# Patient Record
Sex: MALE | Race: BLACK OR AFRICAN AMERICAN | NOT HISPANIC OR LATINO | Employment: OTHER | URBAN - METROPOLITAN AREA
[De-identification: names, ages, dates, MRNs, and addresses within clinical notes are randomized per-mention and may not be internally consistent; named-entity substitution may affect disease eponyms.]

---

## 2019-11-19 ENCOUNTER — CONSULTATION (OUTPATIENT)
Dept: URBAN - METROPOLITAN AREA CLINIC 11 | Facility: CLINIC | Age: 59
End: 2019-11-19

## 2019-11-19 ASSESSMENT — VISUAL ACUITY
OS_CC: 20/40
OD_CC: 20/70
OS_PH: 20/30

## 2019-11-19 ASSESSMENT — TONOMETRY
OS_IOP_MMHG: 18
OD_IOP_MMHG: 17

## 2019-11-19 NOTE — PATIENT DISCUSSION
Greater than 50% of exam spent in face to face dialogue with . Chacorta Ross. I have explained that he has swelling in the central portion of his retina, due to his diabetic condition. I have recommended that he have an injection of Avastin in his right eye, pending approval from his insurance company.  I will observe the left eye at this stage.

## 2020-04-14 ENCOUNTER — FOLLOW UP (OUTPATIENT)
Dept: URBAN - METROPOLITAN AREA CLINIC 11 | Facility: CLINIC | Age: 60
End: 2020-04-14

## 2020-04-14 ASSESSMENT — VISUAL ACUITY
OD_CC: 20/60+2
OS_CC: 20/25+2

## 2020-04-14 ASSESSMENT — TONOMETRY
OS_IOP_MMHG: 19
OD_IOP_MMHG: 18

## 2020-04-14 NOTE — PATIENT DISCUSSION
Due to persistent edema in the right eye, I have recommended that he have an injection of Triesence while he is here today. I will see him again in 4-6 weeks for re-evaluation with the possibility of an Avastin.

## 2020-04-14 NOTE — PROCEDURE NOTE: CLINICAL
PROCEDURE NOTE: Intravitreal Triesence #1 OD. Diagnosis: Diabetes, Type II with Ocular Complications. Anesthesia: Topical/Subconjunctival. Prep: Betadine Flush. Prior to injection, risks/benefits/alternatives discussed including infection, loss of vision, hemorrhage, cataract, glaucoma, retinal tears or detachment. The off-label status of Intravitreal Triesence also was reviewed. The patient wished to proceed with treatment. Betadine prep was performed. Intravitreal injection of Triescence 40.0 mg/0.10 ml was given. Injection site: 3-4 mm from the limbus in the inferotemporal quadrant. Patient tolerated procedure well. There were no complications. Central retinal artery was perfused after injection. Post injection instructions given. Tracking # *. Lot #: [id number]. Expiration Date: 11/1/2021. Inventory Id: null. Patient given office phone number/answering service number and advised to call immediately should there be an increase in floaters or redness, loss of vision or pain, or should they have any other questions or concerns. Andrez Chun

## 2020-05-12 ENCOUNTER — FOLLOW UP (OUTPATIENT)
Dept: URBAN - METROPOLITAN AREA CLINIC 11 | Facility: CLINIC | Age: 60
End: 2020-05-12

## 2020-05-12 ASSESSMENT — TONOMETRY: OD_IOP_MMHG: 25

## 2020-05-12 ASSESSMENT — VISUAL ACUITY
OD_PH: 20/50+1
OS_CC: 20/20
OD_CC: 20/50-2

## 2020-05-12 NOTE — PATIENT DISCUSSION
Discussed with patient that there is marked improvement. I recommended that he return for Focal laser treatment in the right eye in the next few weeks.

## 2020-09-21 ENCOUNTER — FOLLOW UP (OUTPATIENT)
Dept: URBAN - METROPOLITAN AREA CLINIC 11 | Facility: CLINIC | Age: 60
End: 2020-09-21

## 2020-09-21 ASSESSMENT — VISUAL ACUITY
OS_CC: 20/40-2
OD_CC: 20/40-2

## 2020-09-21 ASSESSMENT — TONOMETRY
OS_IOP_MMHG: 16
OD_IOP_MMHG: 16

## 2020-09-21 NOTE — PATIENT DISCUSSION
There has been an increase in edema in the left eye since his last visit. The right eye has responded well to treatment. I have asked him to return in a few weeks for focal laser treatment in the left eye.

## 2020-09-21 NOTE — PATIENT DISCUSSION
Discussed with patient that there is marked improvement in the right eye. I recommended that he return for Focal laser treatment in the left eye in the next few weeks.

## 2021-01-18 ENCOUNTER — FOLLOW UP (OUTPATIENT)
Dept: URBAN - METROPOLITAN AREA CLINIC 11 | Facility: CLINIC | Age: 61
End: 2021-01-18

## 2021-01-18 ASSESSMENT — TONOMETRY
OD_IOP_MMHG: 19
OS_IOP_MMHG: 19

## 2021-01-18 ASSESSMENT — VISUAL ACUITY
OD_CC: 20/70
OS_CC: 20/30

## 2021-01-18 NOTE — PATIENT DISCUSSION
There has been an increase in edema in the right eye since his last visit. The left eye has responded well to treatment. I have asked him to return in a few weeks for  supplemental focal laser treatment in his right eye.

## 2021-05-11 ENCOUNTER — FOLLOW UP (OUTPATIENT)
Dept: URBAN - METROPOLITAN AREA CLINIC 11 | Facility: CLINIC | Age: 61
End: 2021-05-11

## 2021-05-11 DIAGNOSIS — E11.3313: ICD-10-CM

## 2021-05-11 DIAGNOSIS — E11.3411: ICD-10-CM

## 2021-05-11 PROCEDURE — 99214 OFFICE O/P EST MOD 30 MIN: CPT

## 2021-05-11 PROCEDURE — 92134 CPTRZ OPH DX IMG PST SGM RTA: CPT

## 2021-05-11 ASSESSMENT — TONOMETRY
OD_IOP_MMHG: 17
OS_IOP_MMHG: 14

## 2021-05-11 ASSESSMENT — VISUAL ACUITY
OD_CC: 20/80
OS_CC: 20/30

## 2021-05-11 NOTE — PATIENT DISCUSSION
35 minutes was spent in face to face dialogue with patient.  I will plan 4 Avastin injections in each eye on an alternating schedule.  Approval will need to be obtained from patients insurance and then we will get him scheduled.  I will begin with the right eye first.

## 2021-05-11 NOTE — PATIENT DISCUSSION
Discussed with patient that there is increased edema seen today in both eyes.  I recommended that he return for Avastin injections in each eye on an alternating schedule treatment. I will plan 4 injections per eye.

## 2021-05-17 ENCOUNTER — TREATMENT ONLY (OUTPATIENT)
Dept: URBAN - METROPOLITAN AREA CLINIC 11 | Facility: CLINIC | Age: 61
End: 2021-05-17

## 2021-05-17 DIAGNOSIS — E11.3411: ICD-10-CM

## 2021-05-17 PROCEDURE — 67028 INJECTION EYE DRUG: CPT

## 2021-05-17 ASSESSMENT — TONOMETRY: OD_IOP_MMHG: 17

## 2021-05-17 ASSESSMENT — VISUAL ACUITY
OD_CC: 20/80+1
OS_CC: 20/40+2

## 2021-05-17 NOTE — PROCEDURE NOTE: CLINICAL
PROCEDURE NOTE: Avastin () #5 OD. Diagnosis: Diabetes, Type II with Ocular Complications. Anesthesia: Topical. Prep: Betadine Drops and Betadine Scrub. Intravitreal injection of Avastin 1.25mg/0.05 ml was given. Injection site: 3-4 mm from the limbus. Patient tolerated procedure well. CF vision checked. There were no complications. Post-op instructions given. Lot #: [id number]. Expiration Date: 6/16/2021. Inventory Id: [email or web address] Crystal

## 2021-05-17 NOTE — PATIENT DISCUSSION
Avastin injection done today in the right eye. I have asked Mr. Cornelius to return in one month for exam in both eyes and possible Avastin injection.

## 2021-06-21 ENCOUNTER — FOLLOW UP (OUTPATIENT)
Dept: URBAN - METROPOLITAN AREA CLINIC 11 | Facility: CLINIC | Age: 61
End: 2021-06-21

## 2021-06-21 DIAGNOSIS — E11.3411: ICD-10-CM

## 2021-06-21 DIAGNOSIS — E11.3312: ICD-10-CM

## 2021-06-21 PROCEDURE — 67028 INJECTION EYE DRUG: CPT

## 2021-06-21 PROCEDURE — 99213 OFFICE O/P EST LOW 20 MIN: CPT

## 2021-06-21 PROCEDURE — 92134 CPTRZ OPH DX IMG PST SGM RTA: CPT

## 2021-06-21 ASSESSMENT — TONOMETRY
OD_IOP_MMHG: 14
OS_IOP_MMHG: 17

## 2021-06-21 ASSESSMENT — VISUAL ACUITY
OS_CC: 20/40
OD_CC: 20/100

## 2021-06-21 NOTE — PATIENT DISCUSSION
35 minutes was spent in face to face dialogue with  Kee Lawler. I will plan to alternate treatment every 2-3 weeks for a total of 4 more in each eye.

## 2021-06-21 NOTE — PROCEDURE NOTE: CLINICAL
PROCEDURE NOTE: Avastin () #2 OS. Diagnosis: Diabetes, Type II with Ocular Complications. Anesthesia: Topical/Subconjunctival. Prep: Betadine Drops and Betadine Scrub. Intravitreal injection of Avastin 1.25mg/0.05 ml was given. Injection site: 3-4 mm from the limbus. Patient tolerated procedure well. CF vision checked. There were no complications. Post-op instructions given. Lot #: [id number]. Expiration Date: 9185-87-68J97:00:00. Inventory Id: dinora Alvarez

## 2021-06-21 NOTE — PATIENT DISCUSSION
Avastin injection done today in the left eye. He will return in 2-3 weeks for Avastin in the right eye.

## 2021-11-29 ENCOUNTER — FOLLOW UP (OUTPATIENT)
Dept: URBAN - METROPOLITAN AREA CLINIC 11 | Facility: CLINIC | Age: 61
End: 2021-11-29

## 2021-11-29 DIAGNOSIS — H35.373: ICD-10-CM

## 2021-11-29 DIAGNOSIS — E11.3313: ICD-10-CM

## 2021-11-29 PROCEDURE — 67028 INJECTION EYE DRUG: CPT

## 2021-11-29 PROCEDURE — 99214 OFFICE O/P EST MOD 30 MIN: CPT

## 2021-11-29 PROCEDURE — 92134 CPTRZ OPH DX IMG PST SGM RTA: CPT

## 2021-11-29 ASSESSMENT — VISUAL ACUITY
OS_CC: 20/20-2
OD_CC: 20/100

## 2021-11-29 ASSESSMENT — TONOMETRY
OD_IOP_MMHG: 17
OS_IOP_MMHG: 17

## 2021-11-29 NOTE — PATIENT DISCUSSION
Membrane is present in both eyes and is increasing slowly in the right.  Recommend observation for now.

## 2021-11-29 NOTE — PATIENT DISCUSSION
Discussed with patient that there is increased edema seen today in the right eye.  Avastin injection recommended in his right eye today.  Patient tolerated procedure well.

## 2021-11-29 NOTE — PATIENT DISCUSSION
35 minutes was spent in face to face dialogue with Mr. Luis Rios. I will plan three more Avastin injections in the right eye on a monthly basis.

## 2021-11-29 NOTE — PROCEDURE NOTE: CLINICAL
PROCEDURE NOTE: Avastin () #6 OD. Diagnosis: Diabetes, Type II with Ocular Complications. Anesthesia: Topical/Subconjunctival. Prep: Betadine Drops and Betadine Scrub. Intravitreal injection of Avastin 1.25mg/0.05 ml was given. Injection site: 3-4 mm from the limbus. Patient tolerated procedure well. CF vision checked. There were no complications. Post-op instructions given. Lot #: [id number]. Expiration Date: 1/8/2022. Inventory Id: [email or web address] Jacobs

## 2021-12-28 ENCOUNTER — FOLLOW UP (OUTPATIENT)
Dept: URBAN - METROPOLITAN AREA CLINIC 11 | Facility: CLINIC | Age: 61
End: 2021-12-28

## 2021-12-28 DIAGNOSIS — H35.373: ICD-10-CM

## 2021-12-28 DIAGNOSIS — E11.3313: ICD-10-CM

## 2021-12-28 PROCEDURE — 67028 INJECTION EYE DRUG: CPT

## 2021-12-28 PROCEDURE — 92134 CPTRZ OPH DX IMG PST SGM RTA: CPT

## 2021-12-28 PROCEDURE — 99213 OFFICE O/P EST LOW 20 MIN: CPT

## 2021-12-28 ASSESSMENT — TONOMETRY
OD_IOP_MMHG: 18
OS_IOP_MMHG: 21

## 2021-12-28 ASSESSMENT — VISUAL ACUITY
OD_PH: 20/60-1
OD_CC: 20/100
OS_CC: 20/40

## 2021-12-28 NOTE — PATIENT DISCUSSION
35 minutes was spent in face to face dialogue with  Rayshawn Boucher. There is new edema in the left eye today.  I will plan an Avastin today in the left followed by three more Avastin injections in BOTH eyes on an alternating every 2-3 week basis, starting with the left eye today.

## 2021-12-28 NOTE — PROCEDURE NOTE: CLINICAL
PROCEDURE NOTE: Avastin () #3 OS. Diagnosis: Diabetes, Type II with Ocular Complications. Anesthesia: Topical/Subconjunctival. Prep: Betadine Drops and Betadine Scrub. Intravitreal injection of Avastin 1.25mg/0.05 ml was given. Injection site: 3-4 mm from the limbus. Patient tolerated procedure well. CF vision checked. There were no complications. Post-op instructions given. Lot #: [id number]. Expiration Date: 5091-07-79D86:00:00. Inventory Id: [id number]. Steve Carballo

## 2021-12-28 NOTE — PATIENT DISCUSSION
There is increased central edema seen today in the LEFT eye.  Avastin injection recommended today in the left eye.

## 2022-01-18 ENCOUNTER — CLINIC PROCEDURE ONLY (OUTPATIENT)
Dept: URBAN - METROPOLITAN AREA CLINIC 11 | Facility: CLINIC | Age: 62
End: 2022-01-18

## 2022-01-18 DIAGNOSIS — E11.3313: ICD-10-CM

## 2022-01-18 PROCEDURE — 67028 INJECTION EYE DRUG: CPT

## 2022-01-18 ASSESSMENT — VISUAL ACUITY
OD_CC: 20/60-2
OS_CC: 20/30

## 2022-01-18 ASSESSMENT — TONOMETRY
OD_IOP_MMHG: 19
OS_IOP_MMHG: 19

## 2022-01-18 NOTE — PATIENT DISCUSSION
Avastin injection done in the right eye today. The injection was given and tolerated well by patient. Post-injection instructions were reviewed and understood by the patient. Alternating treatment in each eye.  He will return in 2-3 weeks for Avastin #4/6 in the LEFT eye.

## 2022-01-18 NOTE — PROCEDURE NOTE: CLINICAL
PROCEDURE NOTE: Avastin () #7 OD. Diagnosis: Diabetes, Type II with Ocular Complications. Anesthesia: Topical/Subconjunctival. Prep: Betadine Drops and Betadine Scrub. Betadine prep was performed. Product is within expiration date, and has been verified. An unopened 30 g needle was securely affixed to the 1 cc syringe. Excess drug and air bubbles were carefully expressed such that the plunger aligned with the .05 mL ok on the syringe. A lid speculum was used. After the Betadine prep, intravitreal injection of Avastin 1.25mg/0.05 ml was given. Injection site: 3-4 mm from the limbus. The needle was withdrawn; retinal perfusion was verified. Lid speculum removed. The eye was irrigated with sterile irrigating solution. The betadine was washed away. Patient tolerated procedure well. Count fingers vision was verified. There were no complications. Patient given office phone number/answering service phone number. Post-injection instructions were reviewed and understood. Symptoms of RD and endophthalmitis following intravitreal injection were discussed. Patient advised to call right away if any loss of vision, new floaters, or eye pain. Post-op instructions given. Patient was given the standard instruction sheet. Appropriate follow-up was arranged. Isabelle Becerra

## 2022-02-07 ENCOUNTER — CLINIC PROCEDURE ONLY (OUTPATIENT)
Dept: URBAN - METROPOLITAN AREA CLINIC 11 | Facility: CLINIC | Age: 62
End: 2022-02-07

## 2022-02-07 DIAGNOSIS — E11.3313: ICD-10-CM

## 2022-02-07 PROCEDURE — 67028 INJECTION EYE DRUG: CPT

## 2022-02-07 ASSESSMENT — VISUAL ACUITY
OD_CC: 20/60-1
OS_CC: 20/25-2

## 2022-02-07 ASSESSMENT — TONOMETRY: OS_IOP_MMHG: 16

## 2022-02-07 NOTE — PROCEDURE NOTE: CLINICAL
PROCEDURE NOTE: Avastin () #4 OS. Diagnosis: Diabetes, Type II with Ocular Complications. Anesthesia: Topical/Subconjunctival. Prep: Betadine Drops and Betadine Scrub. Betadine prep was performed. Product is within expiration date, and has been verified. An unopened 30 g needle was securely affixed to the 1 cc syringe. Excess drug and air bubbles were carefully expressed such that the plunger aligned with the .05 mL ok on the syringe. A lid speculum was used. After the Betadine prep, intravitreal injection of Avastin 1.25mg/0.05 ml was given. Injection site: 3-4 mm from the limbus. The needle was withdrawn; retinal perfusion was verified. Lid speculum removed. The eye was irrigated with sterile irrigating solution. The betadine was washed away. Patient tolerated procedure well. Count fingers vision was verified. There were no complications. Patient given office phone number/answering service phone number. Post-injection instructions were reviewed and understood. Symptoms of RD and endophthalmitis following intravitreal injection were discussed. Patient advised to call right away if any loss of vision, new floaters, or eye pain. Post-op instructions given. Patient was given the standard instruction sheet. Appropriate follow-up was arranged. Wes Kitchen

## 2022-02-10 NOTE — PATIENT DISCUSSION
Avastin done today in the left eye without complication. He will return in 2-3 weeks for Avastin RIGHT eye. scheduling requirements include:    Procedure: Screening Colonoscopy    Facility: [facility]    Admission Type: Outpatient Surgery    Time Needed: 30 min    Anesthesia: MAC    Surgical Assist: no    Co-Surgeon: no    Special equipment: None      Preoperative History and Physical: To be updated prior to procedure    Preoperative labs: None    Schedule postoperative appointment: None    Patient Prep Instructions:                          Dr. Mack bowel prep    Additional Instructions:   - No ASA for 7 days before surgery    - No Coumadin for 5 days before surgery    - No Plavix for 7 days before surgery    Hospital Orders:  - None

## 2022-02-21 ENCOUNTER — CLINIC PROCEDURE ONLY (OUTPATIENT)
Dept: URBAN - METROPOLITAN AREA CLINIC 11 | Facility: CLINIC | Age: 62
End: 2022-02-21

## 2022-02-21 DIAGNOSIS — E11.3313: ICD-10-CM

## 2022-02-21 PROCEDURE — 67028 INJECTION EYE DRUG: CPT

## 2022-02-21 ASSESSMENT — VISUAL ACUITY
OD_CC: 20/70
OS_CC: 20/40

## 2022-02-21 ASSESSMENT — TONOMETRY
OS_IOP_MMHG: 15
OD_IOP_MMHG: 16

## 2022-02-21 NOTE — PROCEDURE NOTE: CLINICAL
PROCEDURE NOTE: Avastin () #8 OD. Diagnosis: Diabetes, Type II with Ocular Complications. Anesthesia: Topical/Subconjunctival. Prep: Betadine Drops and Betadine Scrub. Betadine prep was performed. Product is within expiration date, and has been verified. An unopened 30 g needle was securely affixed to the 1 cc syringe. Excess drug and air bubbles were carefully expressed such that the plunger aligned with the .05 mL ok on the syringe. A lid speculum was used. After the Betadine prep, intravitreal injection of Avastin 1.25mg/0.05 ml was given. Injection site: 3-4 mm from the limbus. The needle was withdrawn; retinal perfusion was verified. Lid speculum removed. The eye was irrigated with sterile irrigating solution. The betadine was washed away. Patient tolerated procedure well. Count fingers vision was verified. There were no complications. Patient given office phone number/answering service phone number. Post-injection instructions were reviewed and understood. Symptoms of RD and endophthalmitis following intravitreal injection were discussed. Patient advised to call right away if any loss of vision, new floaters, or eye pain. Post-op instructions given. Patient was given the standard instruction sheet. Appropriate follow-up was arranged. Wes Kitchen

## 2022-02-21 NOTE — PATIENT DISCUSSION
Avastin done today in the RIGHT eye without complication. He will return in 2-3 weeks for Avastin LEFT eye.

## 2022-03-07 ENCOUNTER — CLINIC PROCEDURE ONLY (OUTPATIENT)
Dept: URBAN - METROPOLITAN AREA CLINIC 11 | Facility: CLINIC | Age: 62
End: 2022-03-07

## 2022-03-07 DIAGNOSIS — E11.3313: ICD-10-CM

## 2022-03-07 PROCEDURE — 67028 INJECTION EYE DRUG: CPT

## 2022-03-07 ASSESSMENT — TONOMETRY
OD_IOP_MMHG: 19
OS_IOP_MMHG: 18

## 2022-03-07 ASSESSMENT — VISUAL ACUITY
OD_CC: 20/60+2
OS_CC: 20/25-2

## 2022-03-07 NOTE — PATIENT DISCUSSION
Avastin injection #5/6 done today in the left eye.  He will return in 2-3 weeks for Avastin injection #9/9 in his RIGHT eye.

## 2022-03-07 NOTE — PROCEDURE NOTE: CLINICAL
PROCEDURE NOTE: Avastin () OS. Diagnosis: Diabetes, Type II with Ocular Complications. Anesthesia: Topical/Subconjunctival. Prep: Betadine Drops and Betadine Scrub. Betadine prep was performed. Product is within expiration date, and has been verified. An unopened 30 g needle was securely affixed to the 1 cc syringe. Excess drug and air bubbles were carefully expressed such that the plunger aligned with the .05 mL ok on the syringe. A lid speculum was used. After the Betadine prep, intravitreal injection of Avastin 1.25mg/0.05 ml was given. Injection site: 3-4 mm from the limbus. The needle was withdrawn; retinal perfusion was verified. Lid speculum removed. The eye was irrigated with sterile irrigating solution. The betadine was washed away. Patient tolerated procedure well. Count fingers vision was verified. There were no complications. Patient given office phone number/answering service phone number. Post-injection instructions were reviewed and understood. Symptoms of RD and endophthalmitis following intravitreal injection were discussed. Patient advised to call right away if any loss of vision, new floaters, or eye pain. Post-op instructions given. Patient was given the standard instruction sheet. Appropriate follow-up was arranged. Andres Mason

## 2022-03-21 ENCOUNTER — CLINIC PROCEDURE ONLY (OUTPATIENT)
Dept: URBAN - METROPOLITAN AREA CLINIC 11 | Facility: CLINIC | Age: 62
End: 2022-03-21

## 2022-03-21 DIAGNOSIS — E11.3313: ICD-10-CM

## 2022-03-21 PROCEDURE — 67028 INJECTION EYE DRUG: CPT

## 2022-03-21 ASSESSMENT — TONOMETRY
OS_IOP_MMHG: 18
OD_IOP_MMHG: 18

## 2022-03-21 ASSESSMENT — VISUAL ACUITY
OD_CC: 20/70-2
OS_CC: 20/30

## 2022-03-21 NOTE — PROCEDURE NOTE: CLINICAL
PROCEDURE NOTE: Avastin () #9 OD. Diagnosis: Diabetes, Type II with Ocular Complications. Anesthesia: Topical/Subconjunctival. Prep: Betadine Drops and Betadine Scrub. Betadine prep was performed. Product is within expiration date, and has been verified. An unopened 30 g needle was securely affixed to the 1 cc syringe. Excess drug and air bubbles were carefully expressed such that the plunger aligned with the .05 mL ok on the syringe. A lid speculum was used. After the Betadine prep, intravitreal injection of Avastin 1.25mg/0.05 ml was given. Injection site: 3-4 mm from the limbus. The needle was withdrawn; retinal perfusion was verified. Lid speculum removed. The eye was irrigated with sterile irrigating solution. The betadine was washed away. Patient tolerated procedure well. Count fingers vision was verified. There were no complications. Patient given office phone number/answering service phone number. Post-injection instructions were reviewed and understood. Symptoms of RD and endophthalmitis following intravitreal injection were discussed. Patient advised to call right away if any loss of vision, new floaters, or eye pain. Post-op instructions given. Patient was given the standard instruction sheet. Appropriate follow-up was arranged. Luis Larsen

## 2022-03-21 NOTE — PATIENT DISCUSSION
Avastin injection #9/9 done today in the RIGHT eye.  He will return in 2-3 weeks for Avastin injection # 6/6  in his LEFT eye.

## 2022-04-04 ENCOUNTER — CLINIC PROCEDURE ONLY (OUTPATIENT)
Dept: URBAN - METROPOLITAN AREA CLINIC 11 | Facility: CLINIC | Age: 62
End: 2022-04-04

## 2022-04-04 DIAGNOSIS — E11.3313: ICD-10-CM

## 2022-04-04 PROCEDURE — 67028 INJECTION EYE DRUG: CPT

## 2022-04-04 ASSESSMENT — TONOMETRY
OD_IOP_MMHG: 13
OS_IOP_MMHG: 12

## 2022-04-04 ASSESSMENT — VISUAL ACUITY
OD_CC: 20/70
OS_CC: 20/30-1
OD_PH: 20/60-1

## 2022-04-04 NOTE — PROCEDURE NOTE: CLINICAL
PROCEDURE NOTE: Avastin () #6 OS. Diagnosis: Diabetes, Type II with Ocular Complications. Anesthesia: Topical/Subconjunctival. Prep: Betadine Drops and Betadine Scrub. Betadine prep was performed. Product is within expiration date, and has been verified. An unopened 30 g needle was securely affixed to the 1 cc syringe. Excess drug and air bubbles were carefully expressed such that the plunger aligned with the .05 mL ok on the syringe. A lid speculum was used. After the Betadine prep, intravitreal injection of Avastin 1.25mg/0.05 ml was given. Injection site: 3-4 mm from the limbus. The needle was withdrawn; retinal perfusion was verified. Lid speculum removed. The eye was irrigated with sterile irrigating solution. The betadine was washed away. Patient tolerated procedure well. Count fingers vision was verified. There were no complications. Patient given office phone number/answering service phone number. Post-injection instructions were reviewed and understood. Symptoms of RD and endophthalmitis following intravitreal injection were discussed. Patient advised to call right away if any loss of vision, new floaters, or eye pain. Post-op instructions given. Patient was given the standard instruction sheet. Appropriate follow-up was arranged. Robert Sandy

## 2022-04-04 NOTE — PATIENT DISCUSSION
Avastin injection in the LEFT EYE #6/6  today. The injection was given and tolerated well by patient. Post-injection instructions were reviewed and understood by the patient.

## 2022-05-16 ENCOUNTER — FOLLOW UP (OUTPATIENT)
Dept: URBAN - METROPOLITAN AREA CLINIC 11 | Facility: CLINIC | Age: 62
End: 2022-05-16

## 2022-05-16 DIAGNOSIS — E11.3313: ICD-10-CM

## 2022-05-16 PROCEDURE — 92134 CPTRZ OPH DX IMG PST SGM RTA: CPT

## 2022-05-16 PROCEDURE — 99214 OFFICE O/P EST MOD 30 MIN: CPT

## 2022-05-16 ASSESSMENT — VISUAL ACUITY
OS_SC: 20/30-2
OD_SC: 20/80-1

## 2022-05-16 ASSESSMENT — TONOMETRY
OD_IOP_MMHG: 17
OS_IOP_MMHG: 16

## 2022-05-16 NOTE — PATIENT DISCUSSION
spent 40 min face to face with patient.  Discussed findings on exam today.  Persistent edema was seen OU on exam and oct.  Recommend returning in 1 mth for FOCAL OD then OS.

## 2022-06-20 RX ORDER — OMEPRAZOLE 20 MG/1
CAPSULE, DELAYED RELEASE ORAL
COMMUNITY

## 2022-06-20 RX ORDER — ATORVASTATIN CALCIUM 40 MG/1
TABLET, FILM COATED ORAL
COMMUNITY
Start: 2021-11-05

## 2022-06-20 RX ORDER — ASPIRIN 81 MG/1
TABLET, CHEWABLE ORAL
COMMUNITY

## 2022-06-28 ENCOUNTER — CLINIC PROCEDURE ONLY (OUTPATIENT)
Dept: URBAN - METROPOLITAN AREA CLINIC 11 | Facility: CLINIC | Age: 62
End: 2022-06-28

## 2022-06-28 DIAGNOSIS — E11.3313: ICD-10-CM

## 2022-06-28 PROCEDURE — 67210 TREATMENT OF RETINAL LESION: CPT

## 2022-06-28 ASSESSMENT — VISUAL ACUITY
OS_CC: 20/40-1
OD_CC: 20/200

## 2022-06-28 ASSESSMENT — TONOMETRY
OD_IOP_MMHG: 20
OS_IOP_MMHG: 25

## 2022-06-28 NOTE — PATIENT DISCUSSION
Focal laser done and tolerated well by the patient. Post-laser instructions were reviewed and understood by the patient.

## 2022-06-28 NOTE — PROCEDURE NOTE: CLINICAL
PROCEDURE NOTE: Focal Laser, Retina OD. Diagnosis: Diabetic Macular Edema. Anesthesia: Topical. Prior to focal laser, risks/benefits/alternatives to laser discussed including loss of vision and patient wished to proceed. Spot size: * um. Power: * mW. Number of pulses: *. Patient tolerated procedure well. There were no complications. Post procedure instructions given. Robb Spence

## 2022-09-27 ENCOUNTER — FOLLOW UP (OUTPATIENT)
Dept: URBAN - METROPOLITAN AREA CLINIC 11 | Facility: CLINIC | Age: 62
End: 2022-09-27

## 2022-09-27 DIAGNOSIS — H35.373: ICD-10-CM

## 2022-09-27 DIAGNOSIS — Z79.4: ICD-10-CM

## 2022-09-27 DIAGNOSIS — E11.3213: ICD-10-CM

## 2022-09-27 PROCEDURE — 99214 OFFICE O/P EST MOD 30 MIN: CPT

## 2022-09-27 PROCEDURE — 67028 INJECTION EYE DRUG: CPT

## 2022-09-27 PROCEDURE — 92134 CPTRZ OPH DX IMG PST SGM RTA: CPT

## 2022-09-27 ASSESSMENT — TONOMETRY
OS_IOP_MMHG: 14
OD_IOP_MMHG: 14

## 2022-09-27 ASSESSMENT — VISUAL ACUITY
OD_CC: 20/100-1
OS_CC: 20/30-2

## 2022-09-27 NOTE — PROCEDURE NOTE: CLINICAL
PROCEDURE NOTE: Avastin () #10 OD. Diagnosis: Diabetic Macular Edema. Anesthesia: Topical/Subconjunctival. Prep: Betadine Drops and Betadine Scrub. Betadine prep was performed. Product is within expiration date, and has been verified. An unopened 30 g needle was securely affixed to the 1 cc syringe. Excess drug and air bubbles were carefully expressed such that the plunger aligned with the .05 mL ok on the syringe. A lid speculum was used. After the Betadine prep, intravitreal injection of Avastin 1.25mg/0.05 ml was given. Injection site: 3-4 mm from the limbus. The needle was withdrawn; retinal perfusion was verified. Lid speculum removed. The eye was irrigated with sterile irrigating solution. The betadine was washed away. Patient tolerated procedure well. Count fingers vision was verified. There were no complications. Patient given office phone number/answering service phone number. Post-injection instructions were reviewed and understood. Symptoms of RD and endophthalmitis following intravitreal injection were discussed. Patient advised to call right away if any loss of vision, new floaters, or eye pain. Post-op instructions given. Patient was given the standard instruction sheet. Appropriate follow-up was arranged. Scottie Morin

## 2022-09-27 NOTE — PATIENT DISCUSSION
Recommended Avastin injection #10/12 today. The injection was given and tolerated well by patient. Post-injection instructions were reviewed and understood by the patient.

## 2022-10-31 ENCOUNTER — CLINIC PROCEDURE ONLY (OUTPATIENT)
Dept: URBAN - METROPOLITAN AREA CLINIC 11 | Facility: CLINIC | Age: 62
End: 2022-10-31

## 2022-10-31 DIAGNOSIS — E11.3213: ICD-10-CM

## 2022-10-31 PROCEDURE — 67028 INJECTION EYE DRUG: CPT

## 2022-10-31 ASSESSMENT — VISUAL ACUITY
OS_CC: 20/30-1
OD_CC: 20/60-2

## 2022-10-31 ASSESSMENT — TONOMETRY
OD_IOP_MMHG: 17
OS_IOP_MMHG: 18

## 2022-10-31 NOTE — PATIENT DISCUSSION
Recommended Avastin injection #11/12 today. The injection was given and tolerated well by patient. Post-injection instructions were reviewed and understood by the patient.

## 2022-10-31 NOTE — PROCEDURE NOTE: CLINICAL
PROCEDURE NOTE: Avastin () #11 OD. Diagnosis: Diabetic Macular Edema. Anesthesia: Topical/Subconjunctival. Prep: Betadine Drops and Betadine Scrub. Betadine prep was performed. Product is within expiration date, and has been verified. An unopened 30 g needle was securely affixed to the 1 cc syringe. Excess drug and air bubbles were carefully expressed such that the plunger aligned with the .05 mL ok on the syringe. A lid speculum was used. After the Betadine prep, intravitreal injection of Avastin 1.25mg/0.05 ml was given. Injection site: 3-4 mm from the limbus. The needle was withdrawn; retinal perfusion was verified. Lid speculum removed. The eye was irrigated with sterile irrigating solution. The betadine was washed away. Patient tolerated procedure well. Count fingers vision was verified. There were no complications. Patient given office phone number/answering service phone number. Post-injection instructions were reviewed and understood. Symptoms of RD and endophthalmitis following intravitreal injection were discussed. Patient advised to call right away if any loss of vision, new floaters, or eye pain. Post-op instructions given. Patient was given the standard instruction sheet. Appropriate follow-up was arranged. Mauricio Hairston

## 2022-12-19 ENCOUNTER — CLINIC PROCEDURE ONLY (OUTPATIENT)
Dept: URBAN - METROPOLITAN AREA CLINIC 11 | Facility: CLINIC | Age: 62
End: 2022-12-19

## 2022-12-19 PROCEDURE — 67028 INJECTION EYE DRUG: CPT

## 2022-12-19 ASSESSMENT — TONOMETRY
OS_IOP_MMHG: 16
OD_IOP_MMHG: 15

## 2022-12-19 ASSESSMENT — VISUAL ACUITY
OD_CC: 20/100
OS_SC: 20/30-1
OD_SC: 20/100
OS_CC: 20/25-1

## 2022-12-19 NOTE — PATIENT DISCUSSION
Avastin injection #12/12 today. The injection was given and tolerated well by patient. Post-injection instructions were reviewed and understood by the patient.

## 2022-12-19 NOTE — PROCEDURE NOTE: CLINICAL
PROCEDURE NOTE: Avastin () #12 OD. Diagnosis: Diabetic Macular Edema. Anesthesia: Topical/Subconjunctival. Prep: Betadine Drops and Betadine Scrub. Betadine prep was performed. Product is within expiration date, and has been verified. An unopened 30 g needle was securely affixed to the 1 cc syringe. Excess drug and air bubbles were carefully expressed such that the plunger aligned with the .05 mL ok on the syringe. After the Betadine prep, intravitreal injection of Avastin 1.25mg/0.05 ml was given. Injection site: 3-4 mm from the limbus. The needle was withdrawn; retinal perfusion was verified. The eye was irrigated with sterile irrigating solution. The betadine was washed away. Patient tolerated procedure well. There were no complications. Patient given office phone number/answering service phone number. Post-injection instructions were reviewed and understood. Symptoms of RD and endophthalmitis following intravitreal injection were discussed. Patient advised to call right away if any loss of vision, new floaters, or eye pain. Post-op instructions given. Patient was given the standard instruction sheet. Appropriate follow-up was arranged. Christopher Wright

## 2023-01-30 ENCOUNTER — FOLLOW UP (OUTPATIENT)
Dept: URBAN - METROPOLITAN AREA CLINIC 11 | Facility: CLINIC | Age: 63
End: 2023-01-30

## 2023-01-30 DIAGNOSIS — H25.13: ICD-10-CM

## 2023-01-30 DIAGNOSIS — E11.3213: ICD-10-CM

## 2023-01-30 DIAGNOSIS — H35.373: ICD-10-CM

## 2023-01-30 DIAGNOSIS — Z79.4: ICD-10-CM

## 2023-01-30 PROCEDURE — 92134 CPTRZ OPH DX IMG PST SGM RTA: CPT

## 2023-01-30 PROCEDURE — 99214 OFFICE O/P EST MOD 30 MIN: CPT

## 2023-01-30 ASSESSMENT — VISUAL ACUITY
OD_CC: 20/60-2
OS_CC: 20/30+1

## 2023-01-30 ASSESSMENT — TONOMETRY
OS_IOP_MMHG: 18
OD_IOP_MMHG: 18

## 2023-05-15 ENCOUNTER — COMPREHENSIVE EXAM (OUTPATIENT)
Dept: URBAN - METROPOLITAN AREA CLINIC 11 | Facility: CLINIC | Age: 63
End: 2023-05-15

## 2023-05-15 DIAGNOSIS — H35.373: ICD-10-CM

## 2023-05-15 DIAGNOSIS — Z79.4: ICD-10-CM

## 2023-05-15 DIAGNOSIS — H25.13: ICD-10-CM

## 2023-05-15 DIAGNOSIS — E11.3213: ICD-10-CM

## 2023-05-15 PROCEDURE — 67028 INJECTION EYE DRUG: CPT

## 2023-05-15 PROCEDURE — 92134 CPTRZ OPH DX IMG PST SGM RTA: CPT

## 2023-05-15 PROCEDURE — J0178S EYLEA SAMPLE

## 2023-05-15 PROCEDURE — 99214 OFFICE O/P EST MOD 30 MIN: CPT

## 2023-05-15 ASSESSMENT — VISUAL ACUITY
OS_CC: 20/40
OD_CC: 20/100

## 2023-05-15 ASSESSMENT — TONOMETRY
OS_IOP_MMHG: 18
OD_IOP_MMHG: 15

## 2023-06-05 ENCOUNTER — CLINIC PROCEDURE ONLY (OUTPATIENT)
Dept: URBAN - METROPOLITAN AREA CLINIC 11 | Facility: CLINIC | Age: 63
End: 2023-06-05

## 2023-06-05 DIAGNOSIS — E11.3213: ICD-10-CM

## 2023-06-05 PROCEDURE — 67028 INJECTION EYE DRUG: CPT

## 2023-06-05 ASSESSMENT — VISUAL ACUITY
OD_CC: 20/80
OS_CC: 20/40

## 2023-06-05 ASSESSMENT — TONOMETRY
OS_IOP_MMHG: 14
OD_IOP_MMHG: 9

## 2023-06-19 ENCOUNTER — CLINIC PROCEDURE ONLY (OUTPATIENT)
Dept: URBAN - METROPOLITAN AREA CLINIC 11 | Facility: CLINIC | Age: 63
End: 2023-06-19

## 2023-06-19 DIAGNOSIS — Z79.4: ICD-10-CM

## 2023-06-19 DIAGNOSIS — E11.3213: ICD-10-CM

## 2023-06-19 PROCEDURE — 67028 INJECTION EYE DRUG: CPT

## 2023-06-19 ASSESSMENT — VISUAL ACUITY
OD_CC: 20/70-1
OS_CC: 20/40

## 2023-06-19 ASSESSMENT — TONOMETRY
OD_IOP_MMHG: 20
OS_IOP_MMHG: 17

## 2023-07-17 ENCOUNTER — CLINIC PROCEDURE ONLY (OUTPATIENT)
Dept: URBAN - METROPOLITAN AREA CLINIC 11 | Facility: CLINIC | Age: 63
End: 2023-07-17

## 2023-07-17 DIAGNOSIS — E11.3213: ICD-10-CM

## 2023-07-17 PROCEDURE — 67028 INJECTION EYE DRUG: CPT

## 2023-07-17 ASSESSMENT — VISUAL ACUITY
OS_CC: 20/40
OD_CC: 20/70

## 2023-07-17 ASSESSMENT — TONOMETRY
OD_IOP_MMHG: 15
OS_IOP_MMHG: 18

## 2023-07-31 ENCOUNTER — CLINIC PROCEDURE ONLY (OUTPATIENT)
Dept: URBAN - METROPOLITAN AREA CLINIC 11 | Facility: CLINIC | Age: 63
End: 2023-07-31

## 2023-07-31 DIAGNOSIS — E11.3213: ICD-10-CM

## 2023-07-31 PROCEDURE — 67028 INJECTION EYE DRUG: CPT

## 2023-07-31 ASSESSMENT — VISUAL ACUITY
OS_CC: 20/40
OD_CC: 20/60

## 2023-07-31 ASSESSMENT — TONOMETRY
OD_IOP_MMHG: 14
OS_IOP_MMHG: 15

## 2023-08-29 ENCOUNTER — CLINIC PROCEDURE ONLY (OUTPATIENT)
Dept: URBAN - METROPOLITAN AREA CLINIC 11 | Facility: CLINIC | Age: 63
End: 2023-08-29

## 2023-08-29 DIAGNOSIS — E11.3213: ICD-10-CM

## 2023-08-29 PROCEDURE — 67028 INJECTION EYE DRUG: CPT

## 2023-08-29 PROCEDURE — J0178S EYLEA SAMPLE

## 2023-08-29 ASSESSMENT — VISUAL ACUITY
OS_CC: 20/40
OD_CC: 20/40-2

## 2023-08-29 ASSESSMENT — TONOMETRY
OD_IOP_MMHG: 21
OS_IOP_MMHG: 19

## 2023-10-02 ENCOUNTER — FOLLOW UP (OUTPATIENT)
Dept: URBAN - METROPOLITAN AREA CLINIC 11 | Facility: CLINIC | Age: 63
End: 2023-10-02

## 2023-10-02 DIAGNOSIS — Z79.4: ICD-10-CM

## 2023-10-02 DIAGNOSIS — H35.373: ICD-10-CM

## 2023-10-02 DIAGNOSIS — E11.3213: ICD-10-CM

## 2023-10-02 DIAGNOSIS — H25.13: ICD-10-CM

## 2023-10-02 DIAGNOSIS — H43.813: ICD-10-CM

## 2023-10-02 PROCEDURE — 99213 OFFICE O/P EST LOW 20 MIN: CPT

## 2023-10-02 PROCEDURE — 92134 CPTRZ OPH DX IMG PST SGM RTA: CPT

## 2023-10-02 ASSESSMENT — TONOMETRY
OS_IOP_MMHG: 13
OD_IOP_MMHG: 10

## 2023-10-02 ASSESSMENT — VISUAL ACUITY
OD_CC: 20/60-1
OS_CC: 20/40-1

## 2023-11-07 ENCOUNTER — CLINIC PROCEDURE ONLY (OUTPATIENT)
Dept: URBAN - METROPOLITAN AREA CLINIC 11 | Facility: CLINIC | Age: 63
End: 2023-11-07

## 2023-11-07 DIAGNOSIS — E11.3213: ICD-10-CM

## 2023-11-07 DIAGNOSIS — Z79.4: ICD-10-CM

## 2023-11-07 PROCEDURE — 67210 TREATMENT OF RETINAL LESION: CPT

## 2023-11-07 ASSESSMENT — TONOMETRY
OS_IOP_MMHG: 22
OD_IOP_MMHG: 18

## 2023-11-07 ASSESSMENT — VISUAL ACUITY
OS_CC: 20/40
OD_CC: 20/60

## 2023-11-21 ENCOUNTER — CLINIC PROCEDURE ONLY (OUTPATIENT)
Dept: URBAN - METROPOLITAN AREA CLINIC 11 | Facility: CLINIC | Age: 63
End: 2023-11-21

## 2023-11-21 DIAGNOSIS — Z79.4: ICD-10-CM

## 2023-11-21 DIAGNOSIS — E11.3213: ICD-10-CM

## 2023-11-21 PROCEDURE — 67210 TREATMENT OF RETINAL LESION: CPT

## 2023-11-21 ASSESSMENT — TONOMETRY
OD_IOP_MMHG: 15
OS_IOP_MMHG: 20

## 2023-11-21 ASSESSMENT — VISUAL ACUITY
OS_CC: 20/40
OD_CC: 20/70

## 2024-02-19 ENCOUNTER — ESTABLISHED PATIENT (OUTPATIENT)
Dept: URBAN - METROPOLITAN AREA CLINIC 11 | Facility: CLINIC | Age: 64
End: 2024-02-19

## 2024-02-19 DIAGNOSIS — E11.3213: ICD-10-CM

## 2024-02-19 DIAGNOSIS — H43.813: ICD-10-CM

## 2024-02-19 DIAGNOSIS — Z79.4: ICD-10-CM

## 2024-02-19 DIAGNOSIS — H25.13: ICD-10-CM

## 2024-02-19 DIAGNOSIS — H35.373: ICD-10-CM

## 2024-02-19 PROCEDURE — 67028 INJECTION EYE DRUG: CPT

## 2024-02-19 PROCEDURE — 92134 CPTRZ OPH DX IMG PST SGM RTA: CPT

## 2024-02-19 PROCEDURE — 99213 OFFICE O/P EST LOW 20 MIN: CPT

## 2024-02-19 ASSESSMENT — VISUAL ACUITY
OS_CC: 20/70
OS_PH: 20/50-2
OD_CC: 20/100

## 2024-02-19 ASSESSMENT — TONOMETRY
OS_IOP_MMHG: 19
OD_IOP_MMHG: 18

## 2024-03-04 ENCOUNTER — CLINIC PROCEDURE ONLY (OUTPATIENT)
Dept: URBAN - METROPOLITAN AREA CLINIC 11 | Facility: CLINIC | Age: 64
End: 2024-03-04

## 2024-03-04 DIAGNOSIS — Z79.4: ICD-10-CM

## 2024-03-04 DIAGNOSIS — E11.3213: ICD-10-CM

## 2024-03-04 PROCEDURE — 67028 INJECTION EYE DRUG: CPT

## 2024-03-04 ASSESSMENT — VISUAL ACUITY
OS_PH: 20/50-1
OD_CC: 20/100
OS_CC: 20/60
OD_PH: 20/80-1

## 2024-03-04 ASSESSMENT — TONOMETRY
OD_IOP_MMHG: 13
OS_IOP_MMHG: 14

## 2024-04-01 ENCOUNTER — FOLLOW UP (OUTPATIENT)
Dept: URBAN - METROPOLITAN AREA CLINIC 11 | Facility: CLINIC | Age: 64
End: 2024-04-01

## 2024-04-01 DIAGNOSIS — H35.373: ICD-10-CM

## 2024-04-01 DIAGNOSIS — H43.813: ICD-10-CM

## 2024-04-01 DIAGNOSIS — Z79.4: ICD-10-CM

## 2024-04-01 DIAGNOSIS — E11.3213: ICD-10-CM

## 2024-04-01 DIAGNOSIS — H43.823: ICD-10-CM

## 2024-04-01 PROCEDURE — 99213 OFFICE O/P EST LOW 20 MIN: CPT

## 2024-04-01 PROCEDURE — 92134 CPTRZ OPH DX IMG PST SGM RTA: CPT

## 2024-04-01 ASSESSMENT — TONOMETRY
OD_IOP_MMHG: 16
OS_IOP_MMHG: 18

## 2024-04-01 ASSESSMENT — VISUAL ACUITY
OS_CC: 20/40-1
OD_CC: 20/70

## 2024-06-03 ENCOUNTER — FOLLOW UP (OUTPATIENT)
Dept: URBAN - METROPOLITAN AREA CLINIC 11 | Facility: CLINIC | Age: 64
End: 2024-06-03

## 2024-06-03 DIAGNOSIS — H43.823: ICD-10-CM

## 2024-06-03 DIAGNOSIS — Z79.4: ICD-10-CM

## 2024-06-03 DIAGNOSIS — H43.813: ICD-10-CM

## 2024-06-03 DIAGNOSIS — E11.3213: ICD-10-CM

## 2024-06-03 DIAGNOSIS — H35.373: ICD-10-CM

## 2024-06-03 PROCEDURE — 99213 OFFICE O/P EST LOW 20 MIN: CPT | Mod: 25

## 2024-06-03 PROCEDURE — 67028 INJECTION EYE DRUG: CPT

## 2024-06-03 PROCEDURE — 92134 CPTRZ OPH DX IMG PST SGM RTA: CPT

## 2024-06-03 ASSESSMENT — TONOMETRY
OS_IOP_MMHG: 15
OD_IOP_MMHG: 15

## 2024-06-03 ASSESSMENT — VISUAL ACUITY
OS_CC: 20/50-1
OD_CC: 20/70-1

## 2024-07-15 ENCOUNTER — FOLLOW UP (OUTPATIENT)
Dept: URBAN - METROPOLITAN AREA CLINIC 11 | Facility: CLINIC | Age: 64
End: 2024-07-15

## 2024-07-15 DIAGNOSIS — Z79.4: ICD-10-CM

## 2024-07-15 DIAGNOSIS — H35.373: ICD-10-CM

## 2024-07-15 DIAGNOSIS — H43.813: ICD-10-CM

## 2024-07-15 DIAGNOSIS — E11.3213: ICD-10-CM

## 2024-07-15 DIAGNOSIS — H43.823: ICD-10-CM

## 2024-07-15 PROCEDURE — 99213 OFFICE O/P EST LOW 20 MIN: CPT

## 2024-07-15 PROCEDURE — 92134 CPTRZ OPH DX IMG PST SGM RTA: CPT

## 2024-07-15 ASSESSMENT — TONOMETRY
OS_IOP_MMHG: 22
OD_IOP_MMHG: 18

## 2024-07-15 ASSESSMENT — VISUAL ACUITY
OD_CC: 20/70
OS_CC: 20/40-1

## 2024-09-16 ENCOUNTER — FOLLOW UP (OUTPATIENT)
Dept: URBAN - METROPOLITAN AREA CLINIC 11 | Facility: CLINIC | Age: 64
End: 2024-09-16

## 2024-09-16 DIAGNOSIS — H35.373: ICD-10-CM

## 2024-09-16 DIAGNOSIS — E11.3213: ICD-10-CM

## 2024-09-16 DIAGNOSIS — H40.013: ICD-10-CM

## 2024-09-16 DIAGNOSIS — Z79.4: ICD-10-CM

## 2024-09-16 DIAGNOSIS — H43.813: ICD-10-CM

## 2024-09-16 DIAGNOSIS — H43.823: ICD-10-CM

## 2024-09-16 PROCEDURE — 99213 OFFICE O/P EST LOW 20 MIN: CPT

## 2024-09-16 PROCEDURE — 92134 CPTRZ OPH DX IMG PST SGM RTA: CPT

## 2024-12-16 ENCOUNTER — FOLLOW UP (OUTPATIENT)
Age: 64
End: 2024-12-16

## 2024-12-16 DIAGNOSIS — H43.823: ICD-10-CM

## 2024-12-16 DIAGNOSIS — E11.3213: ICD-10-CM

## 2024-12-16 DIAGNOSIS — H40.013: ICD-10-CM

## 2024-12-16 DIAGNOSIS — H43.813: ICD-10-CM

## 2024-12-16 DIAGNOSIS — Z79.4: ICD-10-CM

## 2024-12-16 DIAGNOSIS — H35.373: ICD-10-CM

## 2024-12-16 PROCEDURE — 92134 CPTRZ OPH DX IMG PST SGM RTA: CPT

## 2024-12-16 PROCEDURE — 99214 OFFICE O/P EST MOD 30 MIN: CPT | Mod: 25

## 2024-12-16 PROCEDURE — 67028 INJECTION EYE DRUG: CPT

## 2024-12-16 RX ORDER — BRIMONIDINE TARTRATE, TIMOLOL MALEATE 2; 5 MG/ML; MG/ML: 1 SOLUTION/ DROPS OPHTHALMIC TWICE A DAY

## 2025-01-20 ENCOUNTER — FOLLOW UP (OUTPATIENT)
Age: 65
End: 2025-01-20

## 2025-01-20 DIAGNOSIS — E11.3213: ICD-10-CM

## 2025-01-20 DIAGNOSIS — H40.013: ICD-10-CM

## 2025-01-20 DIAGNOSIS — Z79.4: ICD-10-CM

## 2025-01-20 DIAGNOSIS — H43.823: ICD-10-CM

## 2025-01-20 DIAGNOSIS — H43.813: ICD-10-CM

## 2025-01-20 DIAGNOSIS — H35.373: ICD-10-CM

## 2025-01-20 PROCEDURE — 67028 INJECTION EYE DRUG: CPT

## 2025-01-20 PROCEDURE — 92134 CPTRZ OPH DX IMG PST SGM RTA: CPT

## 2025-01-20 PROCEDURE — 99214 OFFICE O/P EST MOD 30 MIN: CPT | Mod: 25

## 2025-02-20 ENCOUNTER — FOLLOW UP WITH CLINIC PROCEDURE (OUTPATIENT)
Age: 65
End: 2025-02-20

## 2025-02-20 DIAGNOSIS — H40.013: ICD-10-CM

## 2025-02-20 DIAGNOSIS — H43.823: ICD-10-CM

## 2025-02-20 DIAGNOSIS — H43.813: ICD-10-CM

## 2025-02-20 DIAGNOSIS — Z79.4: ICD-10-CM

## 2025-02-20 DIAGNOSIS — E11.3213: ICD-10-CM

## 2025-02-20 DIAGNOSIS — H35.373: ICD-10-CM

## 2025-02-20 PROCEDURE — 92134 CPTRZ OPH DX IMG PST SGM RTA: CPT

## 2025-02-20 PROCEDURE — J0178S EYLEA SAMPLE *: Mod: JZ,LT

## 2025-02-20 PROCEDURE — 67028 INJECTION EYE DRUG: CPT

## 2025-02-20 PROCEDURE — 99214 OFFICE O/P EST MOD 30 MIN: CPT | Mod: 25

## 2025-06-02 ENCOUNTER — COMPREHENSIVE EXAM (OUTPATIENT)
Age: 65
End: 2025-06-02

## 2025-06-02 DIAGNOSIS — H35.373: ICD-10-CM

## 2025-06-02 DIAGNOSIS — H43.823: ICD-10-CM

## 2025-06-02 DIAGNOSIS — Z79.4: ICD-10-CM

## 2025-06-02 DIAGNOSIS — E11.3213: ICD-10-CM

## 2025-06-02 DIAGNOSIS — H40.013: ICD-10-CM

## 2025-06-02 DIAGNOSIS — H43.813: ICD-10-CM

## 2025-06-02 DIAGNOSIS — H25.13: ICD-10-CM

## 2025-06-02 PROCEDURE — 92134 CPTRZ OPH DX IMG PST SGM RTA: CPT

## 2025-06-02 PROCEDURE — 99214 OFFICE O/P EST MOD 30 MIN: CPT

## 2025-08-05 ENCOUNTER — FOLLOW UP (OUTPATIENT)
Age: 65
End: 2025-08-05

## 2025-08-05 DIAGNOSIS — Z79.4: ICD-10-CM

## 2025-08-05 DIAGNOSIS — H40.013: ICD-10-CM

## 2025-08-05 DIAGNOSIS — H43.813: ICD-10-CM

## 2025-08-05 DIAGNOSIS — H43.823: ICD-10-CM

## 2025-08-05 DIAGNOSIS — H25.13: ICD-10-CM

## 2025-08-05 DIAGNOSIS — H35.373: ICD-10-CM

## 2025-08-05 DIAGNOSIS — E11.3213: ICD-10-CM

## 2025-08-05 PROCEDURE — 99213 OFFICE O/P EST LOW 20 MIN: CPT

## 2025-08-05 PROCEDURE — 92134 CPTRZ OPH DX IMG PST SGM RTA: CPT
